# Patient Record
Sex: MALE | Race: WHITE | NOT HISPANIC OR LATINO
[De-identification: names, ages, dates, MRNs, and addresses within clinical notes are randomized per-mention and may not be internally consistent; named-entity substitution may affect disease eponyms.]

---

## 2021-04-27 PROBLEM — Z00.00 ENCOUNTER FOR PREVENTIVE HEALTH EXAMINATION: Status: ACTIVE | Noted: 2021-04-27

## 2021-05-04 ENCOUNTER — APPOINTMENT (OUTPATIENT)
Dept: UROLOGY | Facility: CLINIC | Age: 41
End: 2021-05-04
Payer: MEDICAID

## 2021-05-04 VITALS
DIASTOLIC BLOOD PRESSURE: 84 MMHG | BODY MASS INDEX: 47.15 KG/M2 | HEIGHT: 65 IN | TEMPERATURE: 98.1 F | WEIGHT: 283 LBS | SYSTOLIC BLOOD PRESSURE: 122 MMHG | HEART RATE: 101 BPM

## 2021-05-04 DIAGNOSIS — E29.1 TESTICULAR HYPOFUNCTION: ICD-10-CM

## 2021-05-04 PROCEDURE — 99072 ADDL SUPL MATRL&STAF TM PHE: CPT

## 2021-05-04 PROCEDURE — 99204 OFFICE O/P NEW MOD 45 MIN: CPT

## 2021-05-04 RX ORDER — SILDENAFIL 100 MG/1
100 TABLET, FILM COATED ORAL
Qty: 10 | Refills: 11 | Status: ACTIVE | COMMUNITY
Start: 2021-05-04 | End: 1900-01-01

## 2021-05-04 NOTE — ASSESSMENT
[FreeTextEntry1] : hypogonadism\par ED\par morbidly obese\par hormonal evalatuion\par viagra 100\par if fails consider ICI

## 2021-05-04 NOTE — HISTORY OF PRESENT ILLNESS
[FreeTextEntry1] : 40M  to Maddi Yang (38F) comes in with failure to concieive x 17 years. both partners morbidly obese. female evaluation is reportedly negative. no previous male factor evaluation. complains of difficulty with achieving and maintaining erection. +ejaculatory x 10% of time. viagra 50 mg - 6/10 erection. 5 minutes. never tried 100mg dosing. couple was afraid to seek outside help. \par male lap band 2009\par 280 lbs male partner\par

## 2021-05-06 LAB
ESTRADIOL SERPL-MCNC: 42 PG/ML
FSH SERPL-MCNC: 4.1 IU/L
LH SERPL-ACNC: 4.4 IU/L

## 2021-05-10 LAB
TESTOST BND SERPL-MCNC: 11.7 PG/ML
TESTOST SERPL-MCNC: 402.2 NG/DL

## 2021-06-15 ENCOUNTER — APPOINTMENT (OUTPATIENT)
Dept: UROLOGY | Facility: CLINIC | Age: 41
End: 2021-06-15
Payer: MEDICAID

## 2021-06-15 VITALS — DIASTOLIC BLOOD PRESSURE: 88 MMHG | TEMPERATURE: 98 F | SYSTOLIC BLOOD PRESSURE: 130 MMHG | HEART RATE: 105 BPM

## 2021-06-15 PROCEDURE — 99212 OFFICE O/P EST SF 10 MIN: CPT

## 2021-06-15 NOTE — HISTORY OF PRESENT ILLNESS
[FreeTextEntry1] : excellent response viagra 100\par very happy\par \par FSH 4.1\par LH 4.4\par he and his wife are completing workup\par now ejauclatory\par very happy!\par

## 2021-06-15 NOTE — ASSESSMENT
[FreeTextEntry1] : hypogonadism\par excellent response\par reassured\par to proceed with fertility eval for wife\par f/u 1 year